# Patient Record
Sex: MALE
[De-identification: names, ages, dates, MRNs, and addresses within clinical notes are randomized per-mention and may not be internally consistent; named-entity substitution may affect disease eponyms.]

---

## 2022-02-13 ENCOUNTER — NURSE TRIAGE (OUTPATIENT)
Dept: OTHER | Facility: CLINIC | Age: 62
End: 2022-02-13

## 2022-02-13 NOTE — TELEPHONE ENCOUNTER
Subjective: Caller states He is just not acting right and can't walk. He seems fine when he's sitting but he has no balance. \"\"     Current Symptoms: dizziness, lightheadedness    Onset: 4 hours    Associated Symptoms: denies headache or blurred vision, denies nausea or vomiting    Pain Severity: denies pain    Temperature: denies fever    What has been tried: coffee with milk and sugar; juice    LMP: NA Pregnant: NA    Recommended disposition: PCP within 24 hours. As office is closed, advised to present to nearest [facility]. Care advice provided, patient verbalizes understanding; denies any other questions or concerns; instructed to call back for any new or worsening symptoms. Patient/caller proceeding to nearest [facility]     Attention Provider: Thank you for allowing me to participate in the care of your patient. The patient was connected to triage in response to symptoms provided. Please do not respond through this encounter as the response is not directed to a shared pool. Attention Provider: Thank you for allowing me to participate in the care of your patient. The patient was connected to triage in response to information provided to the [facility]. Please do not respond through this encounter as the response is not directed to a shared pool.     Reason for Disposition   [1] MODERATE dizziness (e.g., interferes with normal activities) AND [2] has NOT been evaluated by physician for this  (Exception: dizziness caused by heat exposure, sudden standing, or poor fluid intake)    Protocols used: [facility]